# Patient Record
Sex: FEMALE | Race: BLACK OR AFRICAN AMERICAN
[De-identification: names, ages, dates, MRNs, and addresses within clinical notes are randomized per-mention and may not be internally consistent; named-entity substitution may affect disease eponyms.]

---

## 2020-08-04 ENCOUNTER — HOSPITAL ENCOUNTER (EMERGENCY)
Dept: HOSPITAL 74 - JER | Age: 30
Discharge: HOME | End: 2020-08-04
Payer: COMMERCIAL

## 2020-08-04 VITALS — TEMPERATURE: 98 F

## 2020-08-04 VITALS — HEART RATE: 67 BPM

## 2020-08-04 VITALS — SYSTOLIC BLOOD PRESSURE: 134 MMHG | DIASTOLIC BLOOD PRESSURE: 86 MMHG

## 2020-08-04 VITALS — BODY MASS INDEX: 27.4 KG/M2

## 2020-08-04 DIAGNOSIS — R00.2: Primary | ICD-10-CM

## 2020-08-04 LAB
ALBUMIN SERPL-MCNC: 4 G/DL (ref 3.4–5)
ALP SERPL-CCNC: 37 U/L (ref 45–117)
ALT SERPL-CCNC: 25 U/L (ref 13–61)
ANION GAP SERPL CALC-SCNC: 11 MMOL/L (ref 8–16)
APPEARANCE UR: CLEAR
APTT BLD: 28.7 SECONDS (ref 25.2–36.5)
AST SERPL-CCNC: 32 U/L (ref 15–37)
BASOPHILS # BLD: 0.7 % (ref 0–2)
BILIRUB SERPL-MCNC: 0.4 MG/DL (ref 0.2–1)
BILIRUB UR STRIP.AUTO-MCNC: NEGATIVE MG/DL
BUN SERPL-MCNC: 9.6 MG/DL (ref 7–18)
CALCIUM SERPL-MCNC: 9.3 MG/DL (ref 8.5–10.1)
CHLORIDE SERPL-SCNC: 103 MMOL/L (ref 98–107)
CO2 SERPL-SCNC: 26 MMOL/L (ref 21–32)
COLOR UR: YELLOW
CREAT SERPL-MCNC: 1.1 MG/DL (ref 0.55–1.3)
DEPRECATED RDW RBC AUTO: 12.5 % (ref 11.6–15.6)
EOSINOPHIL # BLD: 0.3 % (ref 0–4.5)
GLUCOSE SERPL-MCNC: 115 MG/DL (ref 74–106)
HCT VFR BLD CALC: 41.3 % (ref 32.4–45.2)
HGB BLD-MCNC: 14.4 GM/DL (ref 10.7–15.3)
INR BLD: 0.97 (ref 0.83–1.09)
KETONES UR QL STRIP: NEGATIVE
LEUKOCYTE ESTERASE UR QL STRIP.AUTO: NEGATIVE
LYMPHOCYTES # BLD: 30.8 % (ref 8–40)
MCH RBC QN AUTO: 29.1 PG (ref 25.7–33.7)
MCHC RBC AUTO-ENTMCNC: 34.7 G/DL (ref 32–36)
MCV RBC: 83.8 FL (ref 80–96)
MONOCYTES # BLD AUTO: 8.5 % (ref 3.8–10.2)
NEUTROPHILS # BLD: 59.7 % (ref 42.8–82.8)
NITRITE UR QL STRIP: NEGATIVE
PH UR: 7 [PH] (ref 5–8)
PLATELET # BLD AUTO: 319 K/MM3 (ref 134–434)
PMV BLD: 9.5 FL (ref 7.5–11.1)
POTASSIUM SERPLBLD-SCNC: 3.7 MMOL/L (ref 3.5–5.1)
PROT SERPL-MCNC: 8.4 G/DL (ref 6.4–8.2)
PROT UR QL STRIP: NEGATIVE
PROT UR QL STRIP: NEGATIVE
PT PNL PPP: 11.4 SEC (ref 9.7–13)
RBC # BLD AUTO: 4.93 M/MM3 (ref 3.6–5.2)
SODIUM SERPL-SCNC: 140 MMOL/L (ref 136–145)
SP GR UR: 1 (ref 1.01–1.03)
UROBILINOGEN UR STRIP-MCNC: 0.2 MG/DL (ref 0.2–1)
WBC # BLD AUTO: 6.5 K/MM3 (ref 4–10)

## 2020-08-04 PROCEDURE — 3E0337Z INTRODUCTION OF ELECTROLYTIC AND WATER BALANCE SUBSTANCE INTO PERIPHERAL VEIN, PERCUTANEOUS APPROACH: ICD-10-PCS | Performed by: STUDENT IN AN ORGANIZED HEALTH CARE EDUCATION/TRAINING PROGRAM

## 2020-08-04 NOTE — PDOC
Rapid Medical Evaluation


Time Seen by Provider: 08/04/20 14:05


Medical Evaluation: 





08/04/20 14:05





I have performed a brief in-person evaluation of this patient.





The patient presents with a chief complaint of: CP w/ palpitations this am. No 

SOB, cough, f/c, leg pain/swelling. No pmhx, on OCP (Gianvi)





Pertinent physical exam findings:tachy to 115





I have ordered the following:ekg, cxr,labs,dimer





The patient will proceed to the ED for further evaluation.














**Discharge Disposition





- Diagnosis


 Palpitation








- Referrals





- Patient Instructions





- Post Discharge Activity

## 2020-08-04 NOTE — EKG
Test Reason : 

Blood Pressure : ***/*** mmHG

Vent. Rate : 117 BPM     Atrial Rate : 117 BPM

   P-R Int : 174 ms          QRS Dur : 070 ms

    QT Int : 326 ms       P-R-T Axes : 065 047 045 degrees

   QTc Int : 454 ms

 

SINUS TACHYCARDIA

OTHERWISE NORMAL ECG

NO PREVIOUS ECGS AVAILABLE

Confirmed by Michael Shah ([id number]) on 8/4/2020 2:13:33 PM

 

Referred By:             Confirmed By:Michael Shah

## 2020-08-04 NOTE — PDOC
History of Present Illness





- General


Chief Complaint: Chest Pain


Stated Complaint: CHEST PAIN


Time Seen by Provider: 08/04/20 14:05





- History of Present Illness


Initial Comments: 





08/04/20 15:11


HPI:


This is a 31 y/o F with no PMH presenting to the ED because of sudden onset 7/10

pressure in her chest that began at 10am. She reports that the pain is non-

radiating, not worse with exertion, not accompanied by N/V or diaphoresis. A few

hours later she noticed that her heart rate was elevated between 115-150 on her 

apple watch. She denied any other accompanying symptoms at the time except for 

some numbness and tingling in the bilateral extremities. Denies any long car 

rides, recent travel, sick contacts or SOB. She reports that she takes various 

weight loss supplements, two which she has been on for months and one that she 

started yesterday. 





ROS:


GENERAL/CONSTITUTIONAL: No fever/chills. No weakness.


HEAD, EYES, EARS, NOSE AND THROAT: No change in vision. 


CARDIOVASCULAR: Yes chest pain. No shortness of breath.


RESPIRATORY: No cough, wheezing, or hemoptysis.


GASTROINTESTINAL: No nausea, vomiting


GENITOURINARY: No dysuria, frequency


MUSCULOSKELETAL: No joint or muscle swelling or pain. No neck or back pain.


NEUROLOGIC: No headache, loss of consciousness, Yes bilateral upper and lower 

extremity tingling 


HEMATOLOGIC/LYMPHATIC: No anemia, easy bleeding, or history of blood clots.








PMH: Denied


PSx: Denied


Social Hx: Occasional etoh, smokes hookah


Meds: OCP (Givani), Niteburn weight loss supplement, Slim evans weight loss 

supplement


Allergies: Latex





PE:


GENERAL: Awake, alert, and fully oriented. Sitting on bed, appears anxious. 

Conversational. 


HEAD: No signs of trauma. Mild tongue fasciculations. 


EYES: PERRL, EOMI


NECK: Normal ROM, supple, no lymphadenopathy, JVD, or masses


LUNGS: Breath sounds equal, clear to auscultation bilaterally.  No wheezes, and 

no crackles


HEART: Tachycardic, normal S1 and S2, no murmurs, rubs or gallops. Pulses intact

bilaterally in upper and lower extremities. 


ABDOMEN: Soft, nontender, normoactive bowel sounds.  No guarding, no rebound.  

No masses


EXTREMITIES: Normal range of motion, no edema.  No calf erythema or tenderness. 




NEUROLOGICAL: Cranial nerves II through XII grossly intact.  Normal speech, 

normal gait


SKIN: Warm, Dry, normal turgor, no rashes or lesions noted.





08/04/20 15:22


MDM: 


This is a healthy 31 y/o F with no PMH presenting to the ED because of sudden 

onset 7/10 pressure in her chest that began at 10am.


Patient remains tachy, no change in chest pain, saturating at 100% on room air, 

no complaints of SOB


PE vs ACS vs pneumo vs thyroid vs supplements





- CBC


- CXR


- CMP


- TSH, free T4


- D-dimer


- Cardiac Profile


- CXR





EKG


- Sinus tachy





08/04/20 16:08


- CXR 





08/04/20 16:18


                                       CBC











WBC  6.5 K/mm3 (4.0-10.0)   08/04/20  15:00    


 


RBC  4.93 M/mm3 (3.60-5.2)   08/04/20  15:00    


 


Hgb  14.4 GM/dL (10.7-15.3)   08/04/20  15:00    


 


Hct  41.3 % (32.4-45.2)   08/04/20  15:00    


 


MCV  83.8 fl (80-96)   08/04/20  15:00    


 


MCH  29.1 pg (25.7-33.7)   08/04/20  15:00    


 


MCHC  34.7 g/dl (32.0-36.0)   08/04/20  15:00    


 


RDW  12.5 % (11.6-15.6)   08/04/20  15:00    


 


Plt Count  319 K/MM3 (134-434)   08/04/20  15:00    


 


MPV  9.5 fl (7.5-11.1)   08/04/20  15:00    


 


Absolute Neuts (auto)  3.9 K/mm3 (1.5-8.0)   08/04/20  15:00    


 


Neutrophils %  59.7 % (42.8-82.8)   08/04/20  15:00    


 


Lymphocytes %  30.8 % (8-40)   08/04/20  15:00    


 


Monocytes %  8.5 % (3.8-10.2)   08/04/20  15:00    


 


Eosinophils %  0.3 % (0-4.5)   08/04/20  15:00    


 


Basophils %  0.7 % (0-2.0)   08/04/20  15:00    


 


Nucleated RBC %  0 % (0-0)   08/04/20  15:00    








No leukocytosis, no anemia





                               Urine Test Results











Urine Color  Yellow   08/04/20  14:44    


 


Urine Appearance  Clear   08/04/20  14:44    


 


Urine pH  7.0  (5.0-8.0)   08/04/20  14:44    


 


Ur Specific Gravity  1.003  (1.010-1.035)  L  08/04/20  14:44    


 


Urine Protein  Negative  (NEGATIVE)   08/04/20  14:44    


 


Urine Glucose (UA)  Negative  (NEGATIVE)   08/04/20  14:44    


 


Urine Ketones  Negative  (NEGATIVE)   08/04/20  14:44    


 


Urine Blood  Negative  (NEGATIVE)   08/04/20  14:44    


 


Urine Nitrite  Negative  (NEGATIVE)   08/04/20  14:44    


 


Urine Bilirubin  Negative  (NEGATIVE)   08/04/20  14:44    


 


Ur Leukocyte Esterase  Negative  (NEGATIVE)   08/04/20  14:44    








08/04/20 16:49


                                       CMP











Sodium  140 mmol/L (136-145)   08/04/20  14:40    


 


Potassium  3.7 mmol/L (3.5-5.1)   08/04/20  14:40    


 


Chloride  103 mmol/L ()   08/04/20  14:40    


 


Carbon Dioxide  26 mmol/L (21-32)   08/04/20  14:40    


 


Anion Gap  11 MMOL/L (8-16)   08/04/20  14:40    


 


BUN  9.6 mg/dL (7-18)   08/04/20  14:40    


 


Creatinine  1.1 mg/dL (0.55-1.3)   08/04/20  14:40    


 


Est GFR (CKD-EPI)AfAm  79.12   08/04/20  14:40    


 


Est GFR (CKD-EPI)NonAf  68.27   08/04/20  14:40    


 


Random Glucose  115 mg/dL ()  H  08/04/20  14:40    


 


Calcium  9.3 mg/dL (8.5-10.1)   08/04/20  14:40    


 


Total Bilirubin  0.4 mg/dL (0.2-1)   08/04/20  14:40    


 


AST  32 U/L (15-37)   08/04/20  14:40    


 


ALT  25 U/L (13-61)   08/04/20  14:40    


 


Alkaline Phosphatase  37 U/L ()  L  08/04/20  14:40    


 


Creatine Kinase  390 U/L ()  H  08/04/20  14:40    


 


Troponin I  < 0.02 ng/ml (0.00-0.05)   08/04/20  14:40    


 


Total Protein  8.4 g/dl (6.4-8.2)  H  08/04/20  14:40    


 


Albumin  4.0 g/dl (3.4-5.0)   08/04/20  14:40    


 


TSH  1.90 uIU/ml (0.358-3.74)   08/04/20  14:40    


 


Free T4  1.12 ng/dl (0.76-1.46)   08/04/20  14:40    


 


Beta HCG, Quant  < 1.0 mIU/ml  08/04/20  14:40    








TSH and T4 WNL


Troponin negative





- Patient in low 100's, will give 1L fluids and reassess


- Patients workup was negative for abnormalities with TSH, Free T4, D-dimer, 

Troponin. 


Likely supplements that she was taking. They have over 800mg of caffeine. C

ombined with the Senna tea, she was probably very dehydrated.





Patient involved with decision making





08/04/20 18:43


- Patient sleeping comfortably, HR in the 60's.





She was advised about return precautions. 





Past History





- Medical History


Allergies/Adverse Reactions: 


                                    Allergies











Allergy/AdvReac Type Severity Reaction Status Date / Time


 


latex Allergy   Verified 08/04/20 14:05











Home Medications: 


Ambulatory Orders





Drospirenone/Estradiol [Angeliq 0.25 mg-0.5 mg Tablet] 1 each PO HS 08/04/20 








COPD: No


Other medical history: DENIES





- Reproductive History


Is Patient Pregnant Now?: No





- Immunization History


Immunization Up to Date: Yes





- Psycho-Social/Smoking History


Smoking History: Never smoked





- Substance Abuse Hx (Audit-C & DAST Scrn)


How often the patient has a drink containing alcohol: Never


Score: In Men: 4 or > Positive; In Women: 3 or > Positive: 0


Screen Result (Pos requires Nsg. Audit-10AR): Negative


In the last yr the pt used illegal drug/Rx for NonMed reason: No


Score:  Yes response is considered Positive: 0


Screen Result (Positive result requires Nsg. DAST-10): Negative





*Physical Exam





- Vital Signs


                                Last Vital Signs











Temp Pulse Resp BP Pulse Ox


 


 98.0 F   115 H  20   133/103 H  100 


 


 08/04/20 14:06  08/04/20 14:06  08/04/20 14:06  08/04/20 14:06  08/04/20 14:06














**Heart Score/ECG Review





- History


History: Slightly suspicious





- Electrocardiogram


EKG: Normal





- Age


Age: </= 45





- Risk Factors


Based on the list above the patient has:: No risk factors known





- Troponin


Troponin: </= normal limit





- Score


Heart Score - Total: 0





- ECG Intrepretation


Comment:: 





08/04/20 18:45


EKG:


Sinus tachycardia


Vent rate 117bpm, IN interval 174ms, QRS duration 70ms, QT/QTc 326/454ms


No previous EKG for comparison





ED Treatment Course





- LABORATORY


CBC & Chemistry Diagram: 


                                 08/04/20 15:00





                                 08/04/20 14:40





Discharge





- Discharge Information


Problems reviewed: Yes


Clinical Impression/Diagnosis: 


 Palpitation





Condition: Improved


Disposition: HOME





- Admission


No





- Follow up/Referral


Referrals: 


[facility] [place] [Provider Group]





- Patient Discharge Instructions


Patient Printed Discharge Instructions:  DI for Tachycardia, DI for Palpitations


Additional Instructions: 


You were seen in the ED today because of tachycardia


We gave you fluids, did l


Your HR decreased to the 60's while you were here, and your symptoms improved.





Make sure to stay hydrated, especially if you are having diarrhea. You can drink

gatorade.


Stop taking all supplements and Senna tea.


We gave you a referral for the [facility]. Please follow up with them within 

the next week.





Return to the ED with any new or worsening chest pain, shortness of breath, or 

palpitations.


Return with any other new or concerning symptoms. 





- Post Discharge Activity

## 2020-08-04 NOTE — PDOC
Documentation entered by Dc Galaviz SCRIBE, acting as scribe for 

Kalen Barrow MD.








Kalen Barrow MD:  This documentation has been prepared by the Guillaume blevins Xhesika, SCRIBE, under my direction and personally reviewed by me in its 

entirety.  I confirm that the documentation accurately reflects all work, 

treatment, procedures, and medical decision making performed by me.  





Attending Attestation





- Resident


Resident Name: Tatiana Nicholas





- ED Attending Attestation


I have performed the following: I have examined & evaluated the patient, The 

case was reviewed & discussed with the resident, I agree w/resident's findings &

plan, Exceptions are as noted





- HPI


HPI: 





08/04/20 14:18


The patient is a 29y/o F with no PMH who presents to the ED with sudden onset 

chest pressure since 10AM. Pt states her chest pressure is 7/10 in severity, 

non-radiating, no aggravating or alleviating factors. Pt reports LE tingling and

weakness. 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 

heart rate was between 115-140.  pt notes she uses multiple diet supplements 

daily (newest one is a tea). 





The patient denies shortness of breath, headache and dizziness, leg swelling, 

hemoptysis. Denies fever, chills, cough, nausea, vomiting, diarrhea and 

constipation. Denies dysuria, frequency, urgency and hematuria.





Allergies: Latex


Social Hx: Smokes hookah, social etoh, denies ivdu














- Physicial Exam


PE: 





08/04/20 16:24


GENERAL: The patient is awake, alert, and fully oriented, Nontoxic - in no acute

distress.


HEAD: Normocephalic, atraumatic.


EYES: extraocular movements intact, sclera anicteric, conjunctiva clear.


ENT: Normal voice,  Moist mucous membranes.


NECK: Normal range of motion, supple


LUNGS: Breath sounds equal, clear to auscultation bilaterally.  No wheezes, no 

rhonchi, no rales.


HEART: tachycardia, normal S1 and S2 without murmur, rub or gallop.


ABDOMEN: Soft, nontender, No guarding, no rebound.  No CVA tenderness


EXTREMITIES: Normal range of motion, no edema.  neg homans, no calf tenderness


NEUROLOGICAL: No facial assymetry, Normal speech, 


PSYCH: Normal mood, normal affect.


SKIN: Warm, Dry, normal turgor,








- Medical Decision Making





08/04/20 15:47


ddx includes but is not limited to aemia, metabolic derangement, stimulant 

overuse, 





08/04/20 17:31


labs reviewed


labs unrmearlble including ddimer and thyroid


no signs of anemia,metabolic derangement





pts HR improving with hydration


pt clnically states she feels better. 





HR improved. i suspect that she is metabolizing some of the stimuilants/cafeeine

in the tablets she took earlier today





anticipate dc with outpatint fu 








**Heart Score/ECG Review





- ECG Impressions


Comment:: 





08/04/20 16:25


Twelve-lead EKG was performed and reviewed by me. 


There is normal sinus rhythm with a rate of 117


The axis is normal. 


The intervals are normal. 


There is normal R wave progression


There are no ST or T wave abnormalities.





Impression: sinus tachycardia





Discharge





- Discharge Information


Problems reviewed: Yes


Clinical Impression/Diagnosis: 


 Palpitation





Condition: Improved


Disposition: HOME





- Follow up/Referral


Referrals: 


[facility] [place] [Provider Group]





- Patient Discharge Instructions


Patient Printed Discharge Instructions:  DI for Tachycardia, DI for Palpitations


Additional Instructions: 


You were seen in the ED today because of tachycardia


We gave you fluids, did l


Your HR decreased to the 60's while you were here, and your symptoms improved.





Make sure to stay hydrated, especially if you are having diarrhea. You can drink

gatorade.


Stop taking all supplements and Senna tea.


We gave you a referral for the [facility]. Please follow up with them within 

the next week.





Return to the ED with any new or worsening chest pain, shortness of breath, or 

palpitations.


Return with any other new or concerning symptoms. 





- Post Discharge Activity

## 2020-08-06 ENCOUNTER — HOSPITAL ENCOUNTER (EMERGENCY)
Dept: HOSPITAL 74 - JER | Age: 30
Discharge: HOME | End: 2020-08-06
Payer: COMMERCIAL

## 2020-08-06 VITALS — SYSTOLIC BLOOD PRESSURE: 139 MMHG | HEART RATE: 83 BPM | TEMPERATURE: 97.9 F | DIASTOLIC BLOOD PRESSURE: 88 MMHG

## 2020-08-06 VITALS — BODY MASS INDEX: 27.4 KG/M2

## 2020-08-06 DIAGNOSIS — R07.89: Primary | ICD-10-CM

## 2020-08-06 LAB
ALBUMIN SERPL-MCNC: 3.8 G/DL (ref 3.4–5)
ALP SERPL-CCNC: 35 U/L (ref 45–117)
ALT SERPL-CCNC: 29 U/L (ref 13–61)
ANION GAP SERPL CALC-SCNC: 7 MMOL/L (ref 8–16)
AST SERPL-CCNC: 30 U/L (ref 15–37)
BASOPHILS # BLD: 1.6 % (ref 0–2)
BILIRUB SERPL-MCNC: 0.3 MG/DL (ref 0.2–1)
BUN SERPL-MCNC: 12 MG/DL (ref 7–18)
CALCIUM SERPL-MCNC: 9.2 MG/DL (ref 8.5–10.1)
CHLORIDE SERPL-SCNC: 105 MMOL/L (ref 98–107)
CO2 SERPL-SCNC: 26 MMOL/L (ref 21–32)
CREAT SERPL-MCNC: 1 MG/DL (ref 0.55–1.3)
DEPRECATED RDW RBC AUTO: 12.6 % (ref 11.6–15.6)
EOSINOPHIL # BLD: 0.9 % (ref 0–4.5)
GLUCOSE SERPL-MCNC: 101 MG/DL (ref 74–106)
HCT VFR BLD CALC: 40.5 % (ref 32.4–45.2)
HGB BLD-MCNC: 14 GM/DL (ref 10.7–15.3)
LYMPHOCYTES # BLD: 35.4 % (ref 8–40)
MCH RBC QN AUTO: 29.1 PG (ref 25.7–33.7)
MCHC RBC AUTO-ENTMCNC: 34.6 G/DL (ref 32–36)
MCV RBC: 84.2 FL (ref 80–96)
MONOCYTES # BLD AUTO: 8 % (ref 3.8–10.2)
NEUTROPHILS # BLD: 54.1 % (ref 42.8–82.8)
PLATELET # BLD AUTO: 301 K/MM3 (ref 134–434)
PMV BLD: 8.8 FL (ref 7.5–11.1)
POTASSIUM SERPLBLD-SCNC: 4.3 MMOL/L (ref 3.5–5.1)
PROT SERPL-MCNC: 8.3 G/DL (ref 6.4–8.2)
RBC # BLD AUTO: 4.81 M/MM3 (ref 3.6–5.2)
SODIUM SERPL-SCNC: 139 MMOL/L (ref 136–145)
WBC # BLD AUTO: 7.4 K/MM3 (ref 4–10)

## 2020-08-06 NOTE — PDOC
History of Present Illness





- General


History Source: Patient


Exam Limitations: No Limitations





- History of Present Illness


Initial Comments: 





08/06/20 19:04


22-year-old female history of seasonal allergies presents complaining of 

palpitations and chest pressure which lasted 2 to 3 minutes while driving this 

evening with nausea.  Patient was evaluated here 2 days ago for similar 

symptoms.  She stopped taking appetite suppressant after ED evaluation 2 days 

ago after taking them for 6 months.  Reports mild headache x 2 days unrelieved 

by ibuprofen. Denies shortness of breath, back pain, abdominal pain, fever, 

chills, diarrhea, vomiting, leg pain, leg swelling, recent travel or any other 

complaint.  Patient reports that she had 2 mild episodes of anxiety in 2013 and 

2016.





ROS: as above





PE:


GENERAL: well-appearing, NAD


HEAD: NCAT


EYES: Pupils equal, round and reactive to light, sclera anicteric, conjunctiva 

clear


ENT: pharynx: no erythema, no exudate, uvula midline


NECK: supple


CHEST: nontender


RESP: clear, no w/r/r


CARDIO: rrr, no m/g/r


ABD: +BS, soft, nontender, non distended


BACK: no midline spinal ttp, no CVAT 


EXTREMITIES: Normal range of motion, no edema


NEUROLOGICAL: Normal speech, normal gait


SKIN: Warm, Dry





Is this a multiple visit Asthma Patient?: No





<Sydnee Blair - Last Filed: 08/06/20 20:25>





<Kaci Joshi - Last Filed: 08/07/20 10:44>





- General


Chief Complaint: Tachycardia


Stated Complaint: TACHYCARDIA


Time Seen by Provider: 08/06/20 17:56





Past History





- Medical History


COPD: No





- Reproductive History


Is Patient Pregnant Now?: No





- Immunization History


Immunization Up to Date: Yes





- Psycho-Social/Smoking History


Smoking History: Never smoked


Have you smoked in the past 12 months: No


Information on smoking cessation initiated: No





- Substance Abuse Hx (Audit-C & DAST Scrn)


How often the patient has a drink containing alcohol: Monthly or less


Number of drinks the patient has on a typical day: 1 or 2


How often the patient has six or more drinks on one occasion: Never


Score: In Men: 4 or > Positive; In Women: 3 or > Positive: 1


Screen Result (Pos requires Nsg. Audit-10AR): Negative


In the last yr the pt used illegal drug/Rx for NonMed reason: No


Score:  Yes response is considered Positive: 0


Screen Result (Positive result requires Nsg. DAST-10): Negative





<Sydnee Blair - Last Filed: 08/06/20 20:25>





<Kaci Joshi - Last Filed: 08/07/20 10:44>





- Medical History


Allergies/Adverse Reactions: 


                                    Allergies











Allergy/AdvReac Type Severity Reaction Status Date / Time


 


latex Allergy   Verified 08/04/20 14:05











Home Medications: 


Ambulatory Orders





Drospirenone/Estradiol [Angeliq 0.25 mg-0.5 mg Tablet] 1 each PO HS 08/04/20 











*Physical Exam





- Vital Signs


                                Last Vital Signs











Temp Pulse Resp BP Pulse Ox


 


 97.9 F   83   17   139/88   100 


 


 08/06/20 16:50  08/06/20 16:50  08/06/20 16:50  08/06/20 16:50  08/06/20 16:50














<Sydnee Blair - Last Filed: 08/06/20 20:25>





- Vital Signs


                                Last Vital Signs











Temp Pulse Resp BP Pulse Ox


 


 97.9 F   83   17   139/88   100 


 


 08/06/20 16:50  08/06/20 16:50  08/06/20 16:50  08/06/20 16:50  08/06/20 16:50














<Kaci Joshi - Last Filed: 08/07/20 10:44>





ED Treatment Course





- LABORATORY


CBC & Chemistry Diagram: 


                                 08/06/20 18:00





                                 08/06/20 18:00





- RADIOLOGY


Radiology Studies Ordered: 














 Category Date Time Status


 


 CHEST PA & LAT [RAD] Stat Radiology  08/06/20 18:46 Ordered














<Sydnee Blair - Last Filed: 08/06/20 20:25>





- LABORATORY


CBC & Chemistry Diagram: 


                                 08/06/20 18:00





                                 08/06/20 18:00





- ADDITIONAL ORDERS


Additional order review: 


                                        











  08/06/20





  18:00


 


RBC  4.81


 


MCV  84.2


 


MCHC  34.6


 


RDW  12.6


 


MPV  8.8


 


Neutrophils %  54.1


 


Lymphocytes %  35.4


 


Monocytes %  8.0


 


Eosinophils %  0.9  D


 


Basophils %  1.6














- Medications


Given in the ED: 


ED Medications














Discontinued Medications














Generic Name Dose Route Start Last Admin





  Trade Name Freq  PRN Reason Stop Dose Admin


 


Ondansetron HCl  4 mg  08/06/20 18:50  08/06/20 19:31





  Alberta Sainz -  SL  08/06/20 18:51  4 mg





  ONCE ONE   Administration














<Kaci Joshi - Last Filed: 08/07/20 10:44>





Medical Decision Making





- Medical Decision Making





08/06/20 19:26


22-year-old female history of seasonal allergies presents complaining of 

palpitations and chest pressure which lasted 2 to 3 minutes while driving this 

evening with nausea.  Patient was evaluated here 2 days ago for similar 

symptoms.  She stopped taking appetite suppressant after ED evaluation 2 days 

ago after taking them for 6 months.  Reports mild headache x 2 days unrelieved 

by ibuprofen. Denies shortness of breath, back pain, abdominal pain, fever, 

chills, diarrhea, vomiting, leg pain, leg swelling, recent travel or any other 

complaint.  Patient reports that she had 2 mild episodes of anxiety in 2013 and 

2016.





Patient is a  and mental health care worker 


patient had full work-up 2 days ago including CBC, CMP, TSH, d-dimer, troponin, 

free T4, chest x-ray unremarkable


Urine pregnancy negative 2 days ago


We will draw CBC, CMP and troponin today


ecg: HR 93, nsr, no st or tw changes


Chest x-ray





08/06/20 20:25


Reviewed labs and discussed results with patient


Chest x-ray unremarkable read by me


HR 69 at this time


Patient declines Ativan p.o.


Agrees to schedule an appointment with PMD within 1 week


Return precautions discussed





<Sydnee Blair - Last Filed: 08/06/20 20:25>





- Medical Decision Making








08/07/20 10:44


agree with midlevel provider, BEAR Blair. 


with HPI and PE. 


vitals here wnl


labs/lytes


ekg is sinus rhythm, nonischemic.


reassess


anticipate discharge





<Kaci Joshi - Last Filed: 08/07/20 10:44>





Discharge





- Discharge Information


Problems reviewed: Yes





- Admission


No





<Sydnee Blair - Last Filed: 08/06/20 20:25>





<Kaci Joshi - Last Filed: 08/07/20 10:44>





- Discharge Information


Clinical Impression/Diagnosis: 


 Chest pressure





Condition: Stable


Disposition: HOME





- Follow up/Referral


Referrals: 


[facility] [place] [Provider Group]





- Patient Discharge Instructions


Additional Instructions: 


Schedule an appointment with a primary care physician as soon as possible


If you develop worsening concerning symptoms return to ED

## 2020-08-07 NOTE — EKG
Test Reason : 

Blood Pressure : ***/*** mmHG

Vent. Rate : 093 BPM     Atrial Rate : 093 BPM

   P-R Int : 138 ms          QRS Dur : 072 ms

    QT Int : 356 ms       P-R-T Axes : 061 045 042 degrees

   QTc Int : 442 ms

 

NORMAL SINUS RHYTHM

POSSIBLE LEFT ATRIAL ENLARGEMENT

WHEN COMPARED WITH ECG OF 04-AUG-2020 14:09,

NO SIGNIFICANT CHANGE WAS FOUND

Confirmed by STEWART MEDRANO MD ([id number]) on 8/7/2020 12:54:30 PM

 

Referred By:             Confirmed By:STEWART MEDRANO MD